# Patient Record
Sex: FEMALE | Race: WHITE | NOT HISPANIC OR LATINO | Employment: UNEMPLOYED | URBAN - METROPOLITAN AREA
[De-identification: names, ages, dates, MRNs, and addresses within clinical notes are randomized per-mention and may not be internally consistent; named-entity substitution may affect disease eponyms.]

---

## 2024-09-29 ENCOUNTER — OFFICE VISIT (OUTPATIENT)
Age: 24
End: 2024-09-29
Payer: COMMERCIAL

## 2024-09-29 VITALS
WEIGHT: 128.8 LBS | RESPIRATION RATE: 18 BRPM | DIASTOLIC BLOOD PRESSURE: 72 MMHG | SYSTOLIC BLOOD PRESSURE: 102 MMHG | HEART RATE: 85 BPM | OXYGEN SATURATION: 98 % | TEMPERATURE: 98 F

## 2024-09-29 DIAGNOSIS — H00.014 HORDEOLUM EXTERNUM OF LEFT UPPER EYELID: Primary | ICD-10-CM

## 2024-09-29 PROCEDURE — S9083 URGENT CARE CENTER GLOBAL: HCPCS | Performed by: NURSE PRACTITIONER

## 2024-09-29 PROCEDURE — G0382 LEV 3 HOSP TYPE B ED VISIT: HCPCS | Performed by: NURSE PRACTITIONER

## 2024-09-29 RX ORDER — NEOMYCIN SULFATE, POLYMYXIN B SULFATE AND BACITRACIN ZINC 3.5; 10000; 4 MG/G; [USP'U]/G; [USP'U]/G
0.5 OINTMENT OPHTHALMIC 2 TIMES DAILY
Qty: 3.5 G | Refills: 0 | Status: SHIPPED | OUTPATIENT
Start: 2024-09-29 | End: 2024-09-29 | Stop reason: RX

## 2024-09-29 RX ORDER — FLUCONAZOLE 150 MG/1
150 TABLET ORAL
COMMUNITY
Start: 2024-09-09

## 2024-09-29 RX ORDER — BACITRACIN 500 [USP'U]/G
OINTMENT OPHTHALMIC EVERY 4 HOURS
Qty: 3.5 G | Refills: 0 | Status: SHIPPED | OUTPATIENT
Start: 2024-09-29 | End: 2024-09-29 | Stop reason: RX

## 2024-09-29 RX ORDER — SULFAMETHOXAZOLE/TRIMETHOPRIM 800-160 MG
1 TABLET ORAL EVERY 12 HOURS SCHEDULED
Qty: 14 TABLET | Refills: 0 | Status: SHIPPED | OUTPATIENT
Start: 2024-09-29 | End: 2024-09-29 | Stop reason: RX

## 2024-09-29 RX ORDER — NORETHINDRONE ACETATE AND ETHINYL ESTRADIOL AND FERROUS FUMARATE 1MG-20(21)
KIT ORAL
COMMUNITY
Start: 2024-09-19

## 2024-09-29 RX ORDER — NEOMYCIN SULFATE, POLYMYXIN B SULFATE AND BACITRACIN ZINC 3.5; 10000; 4 MG/G; [USP'U]/G; [USP'U]/G
0.5 OINTMENT OPHTHALMIC 2 TIMES DAILY
Qty: 3.5 G | Refills: 0 | Status: SHIPPED | OUTPATIENT
Start: 2024-09-29 | End: 2024-09-29 | Stop reason: SDUPTHER

## 2024-09-29 RX ORDER — SULFAMETHOXAZOLE/TRIMETHOPRIM 800-160 MG
1 TABLET ORAL EVERY 12 HOURS SCHEDULED
Qty: 14 TABLET | Refills: 0 | Status: SHIPPED | OUTPATIENT
Start: 2024-09-29 | End: 2024-09-29 | Stop reason: SDUPTHER

## 2024-09-29 RX ORDER — ERYTHROMYCIN 5 MG/G
0.5 OINTMENT OPHTHALMIC EVERY 8 HOURS SCHEDULED
Qty: 21 G | Refills: 0 | Status: SHIPPED | OUTPATIENT
Start: 2024-09-29 | End: 2024-10-06

## 2024-09-29 RX ORDER — DOXEPIN HYDROCHLORIDE 50 MG/1
50 CAPSULE ORAL
COMMUNITY
Start: 2024-07-25

## 2024-09-29 NOTE — PATIENT INSTRUCTIONS
"Patient Education     Stye   The Basics   Written by the doctors and editors at [facility]   What is a stye? -- A stye is a red and painful lump on the eyelid. It happens when a small gland on the edge of the eyelid gets infected or inflamed. Styes can form on the upper or lower eyelids. Most styes get better on their own after a few days to a week. The medical term for stye is \"hordeolum.\"  People sometimes get a stye confused with a different eye problem called a \"chalazion.\" A chalazion also causes a lump on the eyelid. But a stye is caused by an infection and is painful. A chalazion is not tender or painful, but it often lasts longer than a stye does.  What are the symptoms of a stye? -- People who have a stye have a painful lump on the edge of their eyelid (picture 1). The lump might look red, swollen, or similar to a pimple.  A stye usually develops over a few days. Styes can cause other symptoms, too, such as tearing and eyelid pain and swelling.  Is there a test for a stye? -- No. But your doctor or nurse should be able to tell if you have a stye by talking with you and doing an exam.  Is there anything I can do on my own? -- Yes:   Put a warm, wet compress on the stye. Wet a clean washcloth with warm water, and put it over your stye. When the washcloth cools, reheat it with warm water and put it back over the stye. Repeat these steps for about 15 minutes, and try to do this 4 times a day.   It might help to gently massage your eyelid.   Do not squeeze or try to pop your stye. This can make it worse.   Do not wear eye makeup or contact lenses until your stye is gone.  What treatments might my doctor use? -- If your stye doesn't get better or if it leads to other problems, your doctor might:   Prescribe a cream or ointment that goes in the eye and on the eyelid   Prescribe antibiotic medicines   Do a procedure to drain the stye  Can styes be prevented? -- Yes. To lower your chances of getting a stye, you can:   " Wash your hands often - It's especially important to wash your hands before you touch your eyes. Also, if you wear contact lenses, keep them clean and wash your hands before you put them in.   Be careful with your eye makeup - Wearing eye makeup can sometimes cause a stye. Remove your eye makeup each night, and throw away old makeup. Do not share eye makeup with other people.  When should I call the doctor? -- Call your doctor or nurse for advice if:   Your stye doesn't go away after using warm compresses for 1 to 2 weeks.   Your stye gets very big, bleeds, or affects your vision.   Your whole eye is red, or your whole eyelid is red or swollen.   The redness or swelling spreads to your cheek or other parts of your face.  All topics are updated as new evidence becomes available and our peer review process is complete.  This topic retrieved from [employer] on: Feb 26, 2024.  Topic 49041 Version 17.0  Release: 32.2.4 - C32.56  © 2024 UpToDate, Inc. and/or its affiliates. All rights reserved.  picture 1: Stye     This person has a stye on the lower eyelid.  Graphic 70298 Version 2.0  Consumer Information Use and Disclaimer   Disclaimer: This generalized information is a limited summary of diagnosis, treatment, and/or medication information. It is not meant to be comprehensive and should be used as a tool to help the user understand and/or assess potential diagnostic and treatment options. It does NOT include all information about conditions, treatments, medications, side effects, or risks that may apply to a specific patient. It is not intended to be medical advice or a substitute for the medical advice, diagnosis, or treatment of a health care provider based on the health care provider's examination and assessment of a patient's specific and unique circumstances. Patients must speak with a health care provider for complete information about their health, medical questions, and treatment options, including any risks or  benefits regarding use of medications. This information does not endorse any treatments or medications as safe, effective, or approved for treating a specific patient. UpToDate, Inc. and its affiliates disclaim any warranty or liability relating to this information or the use thereof.The use of this information is governed by the Terms of Use, available at https://www.[employer].com/en/know/clinical-effectiveness-terms. 2024© UpToDate, Inc. and its affiliates and/or licensors. All rights reserved.  Copyright   © 2024 UpToDate, Inc. and/or its affiliates. All rights reserved.

## 2024-09-29 NOTE — PROGRESS NOTES
[facility]        NAME: Elizabeth Cazares is a 24 y.o. female  : 2000    MRN: [id number]  DATE: 2024  TIME: 10:55 AM    Assessment and Plan   Hordeolum externum of left upper eyelid [H00.014]  1. Hordeolum externum of left upper eyelid  neomycin-bacitracin-polymyxin (NEOSPORIN) ophthalmic ointment    sulfamethoxazole-trimethoprim (BACTRIM DS) 800-160 mg per tablet    DISCONTINUED: neomycin-bacitracin-polymyxin (NEOSPORIN) ophthalmic ointment    DISCONTINUED: sulfamethoxazole-trimethoprim (BACTRIM DS) 800-160 mg per tablet            Patient Instructions   Will give topical abx and oral abx at this time. Advised on use of medications. Advised pt to f/u with Ophthalmology tomorrow if no improvement or worsening of sx. Pt advised on red flags which warrant more emergent f/u such as change in vision, spreading of redness ect. Pt advised to continue warm compresses.     Follow up with PCP in 3-5 days.  Proceed to  ER if symptoms worsen.    If tests have been performed at [facility], our office will contact you with results if changes need to be made to the care plan discussed with you at the visit.  [facility]    Chief Complaint     Chief Complaint   Patient presents with    Blepharitis     Eyelid swelling that started Wednesday, thinks it started from a stye.          History of Present Illness       Pt reports eye lid swelling 4 days ago. She reports it felt like a stye, but the swelling increased. She reports the area is painful to touch. She has used warm and cold compresses, eye drops for styes, and an antihistime. She denies change in vision, discharge from the area.     Eye Pain   The left eye is affected. This is a new problem. The current episode started in the past 7 days. The problem has been gradually worsening. There was no injury mechanism. The pain is moderate. There is No known exposure to pink eye. She Does not wear contacts.  Pertinent negatives include no blurred vision, eye discharge, double vision, eye redness, fever, foreign body sensation, itching, nausea, photophobia, recent URI or vomiting. Treatments tried: cold and warm compresses, otc stye eye drops. The treatment provided no relief.       Review of Systems   Review of Systems   Constitutional: Negative.  Negative for fever.   HENT: Negative.     Eyes:  Positive for pain (upper left eyelid swelling and pain). Negative for blurred vision, double vision, photophobia, discharge, redness and itching.   Respiratory: Negative.     Cardiovascular: Negative.    Gastrointestinal:  Negative for nausea and vomiting.   Neurological: Negative.          Current Medications       Current Outpatient Medications:     doxepin (SINEquan) 50 mg capsule, Take 50 mg by mouth daily at bedtime, Disp: , Rfl:     fluconazole (DIFLUCAN) 150 mg tablet, 150 mg, Disp: , Rfl:     Junel FE 1/20 1-20 MG-MCG per tablet, , Disp: , Rfl:     neomycin-bacitracin-polymyxin (NEOSPORIN) ophthalmic ointment, Administer 0.5 inches into the left eye 2 (two) times a day, Disp: 3.5 g, Rfl: 0    sulfamethoxazole-trimethoprim (BACTRIM DS) 800-160 mg per tablet, Take 1 tablet by mouth every 12 (twelve) hours for 7 days, Disp: 14 tablet, Rfl: 0    Current Allergies     Allergies as of 09/29/2024    (No Known Allergies)            The following portions of the patient's history were reviewed and updated as appropriate: allergies, current medications, past family history, past medical history, past social history, past surgical history and problem list.     History reviewed. No pertinent past medical history.    History reviewed. No pertinent surgical history.    Family History   Problem Relation Age of Onset    No Known Problems Mother     No Known Problems Father          Medications have been verified.        Objective   /72   Pulse 85   Temp 98 °F (36.7 °C)   Resp 18   Wt 58.4 kg (128 lb 12.8 oz)   LMP 09/10/2024    SpO2 98%   Patient's last menstrual period was 09/10/2024.       Physical Exam     Physical Exam  Constitutional:       General: She is not in acute distress.     Appearance: Normal appearance. She is not ill-appearing.   HENT:      Head: Normocephalic and atraumatic.      Right Ear: External ear normal.      Left Ear: External ear normal.      Nose: Nose normal.   Eyes:      General: Lids are everted, no foreign bodies appreciated. Vision grossly intact. No allergic shiner, visual field deficit or scleral icterus.        Right eye: No foreign body, discharge or hordeolum.         Left eye: Hordeolum present.No foreign body or discharge.      Extraocular Movements:      Right eye: Normal extraocular motion.      Left eye: Normal extraocular motion.      Conjunctiva/sclera: Conjunctivae normal.      Right eye: Right conjunctiva is not injected. No chemosis, exudate or hemorrhage.     Left eye: Left conjunctiva is not injected. No chemosis, exudate or hemorrhage.     Comments: Moderate upper left eyelid swelling, redness, and TTP.    Pulmonary:      Effort: Pulmonary effort is normal. No respiratory distress.   Neurological:      Mental Status: She is alert and oriented to person, place, and time.   Psychiatric:         Mood and Affect: Mood normal.         Behavior: Behavior normal.         Thought Content: Thought content normal.         Judgment: Judgment normal.

## 2024-11-21 ENCOUNTER — OFFICE VISIT (OUTPATIENT)
Age: 24
End: 2024-11-21
Payer: COMMERCIAL

## 2024-11-21 VITALS
WEIGHT: 133 LBS | DIASTOLIC BLOOD PRESSURE: 68 MMHG | SYSTOLIC BLOOD PRESSURE: 116 MMHG | HEART RATE: 82 BPM | BODY MASS INDEX: 23.57 KG/M2 | HEIGHT: 63 IN | OXYGEN SATURATION: 98 %

## 2024-11-21 DIAGNOSIS — G89.29 CHRONIC NONINTRACTABLE HEADACHE, UNSPECIFIED HEADACHE TYPE: ICD-10-CM

## 2024-11-21 DIAGNOSIS — L20.82 FLEXURAL ECZEMA: ICD-10-CM

## 2024-11-21 DIAGNOSIS — F41.1 GAD (GENERALIZED ANXIETY DISORDER): ICD-10-CM

## 2024-11-21 DIAGNOSIS — R51.9 CHRONIC NONINTRACTABLE HEADACHE, UNSPECIFIED HEADACHE TYPE: ICD-10-CM

## 2024-11-21 DIAGNOSIS — Z00.00 ANNUAL PHYSICAL EXAM: Primary | ICD-10-CM

## 2024-11-21 DIAGNOSIS — Z23 ENCOUNTER FOR IMMUNIZATION: ICD-10-CM

## 2024-11-21 PROCEDURE — 90471 IMMUNIZATION ADMIN: CPT | Performed by: FAMILY MEDICINE

## 2024-11-21 PROCEDURE — 99213 OFFICE O/P EST LOW 20 MIN: CPT | Performed by: FAMILY MEDICINE

## 2024-11-21 PROCEDURE — 90656 IIV3 VACC NO PRSV 0.5 ML IM: CPT | Performed by: FAMILY MEDICINE

## 2024-11-21 PROCEDURE — 99385 PREV VISIT NEW AGE 18-39: CPT | Performed by: FAMILY MEDICINE

## 2024-11-21 RX ORDER — DOXEPIN HYDROCHLORIDE 50 MG/1
50 CAPSULE ORAL
Qty: 30 CAPSULE | Refills: 1 | Status: SHIPPED | OUTPATIENT
Start: 2024-11-21

## 2024-11-21 RX ORDER — TRIAMCINOLONE ACETONIDE 1 MG/G
CREAM TOPICAL
COMMUNITY
Start: 2024-10-14

## 2024-11-21 RX ORDER — PIMECROLIMUS 10 MG/G
CREAM TOPICAL
COMMUNITY
Start: 2024-11-19

## 2024-11-21 NOTE — PATIENT INSTRUCTIONS
"Patient Education     Routine physical for adults   The Basics   Written by the doctors and editors at [facility]   What is a physical? -- A physical is a routine visit, or \"check-up,\" with your doctor. You might also hear it called a \"wellness visit\" or \"preventive visit.\"  During each visit, the doctor will:   Ask about your physical and mental health   Ask about your habits, behaviors, and lifestyle   Do an exam   Give you vaccines if needed   Talk to you about any medicines you take   Give advice about your health   Answer your questions  Getting regular check-ups is an important part of taking care of your health. It can help your doctor find and treat any problems you have. But it's also important for preventing health problems.  A routine physical is different from a \"sick visit.\" A sick visit is when you see a doctor because of a health concern or problem. Since physicals are scheduled ahead of time, you can think about what you want to ask the doctor.  How often should I get a physical? -- It depends on your age and health. In general, for people age 21 years and older:   If you are younger than 50 years, you might be able to get a physical every 3 years.   If you are 50 years or older, your doctor might recommend a physical every year.  If you have an ongoing health condition, like diabetes or high blood pressure, your doctor will probably want to see you more often.  What happens during a physical? -- In general, each visit will include:   Physical exam - The doctor or nurse will check your height, weight, heart rate, and blood pressure. They will also look at your eyes and ears. They will ask about how you are feeling and whether you have any symptoms that bother you.   Medicines - It's a good idea to bring a list of all the medicines you take to each doctor visit. Your doctor will talk to you about your medicines and answer any questions. Tell them if you are having any side effects that bother you. You " "should also tell them if you are having trouble paying for any of your medicines.   Habits and behaviors - This includes:   Your diet   Your exercise habits   Whether you smoke, drink alcohol, or use drugs   Whether you are sexually active   Whether you feel safe at home  Your doctor will talk to you about things you can do to improve your health and lower your risk of health problems. They will also offer help and support. For example, if you want to quit smoking, they can give you advice and might prescribe medicines. If you want to improve your diet or get more physical activity, they can help you with this, too.   Lab tests, if needed - The tests you get will depend on your age and situation. For example, your doctor might want to check your:   Cholesterol   Blood sugar   Iron level   Vaccines - The recommended vaccines will depend on your age, health, and what vaccines you already had. Vaccines are very important because they can prevent certain serious or deadly infections.   Discussion of screening - \"Screening\" means checking for diseases or other health problems before they cause symptoms. Your doctor can recommend screening based on your age, risk, and preferences. This might include tests to check for:   Cancer, such as breast, prostate, cervical, ovarian, colorectal, prostate, lung, or skin cancer   Sexually transmitted infections, such as chlamydia and gonorrhea   Mental health conditions like depression and anxiety  Your doctor will talk to you about the different types of screening tests. They can help you decide which screenings to have. They can also explain what the results might mean.   Answering questions - The physical is a good time to ask the doctor or nurse questions about your health. If needed, they can refer you to other doctors or specialists, too.  Adults older than 65 years often need other care, too. As you get older, your doctor will talk to you about:   How to prevent falling at " home   Hearing or vision tests   Memory testing   How to take your medicines safely   Making sure that you have the help and support you need at home  All topics are updated as new evidence becomes available and our peer review process is complete.  This topic retrieved from [employer] on: May 02, 2024.  Topic 259702 Version 1.0  Release: 32.4.3 - C32.122  © 2024 UpToDate, Inc. and/or its affiliates. All rights reserved.  Consumer Information Use and Disclaimer   Disclaimer: This generalized information is a limited summary of diagnosis, treatment, and/or medication information. It is not meant to be comprehensive and should be used as a tool to help the user understand and/or assess potential diagnostic and treatment options. It does NOT include all information about conditions, treatments, medications, side effects, or risks that may apply to a specific patient. It is not intended to be medical advice or a substitute for the medical advice, diagnosis, or treatment of a health care provider based on the health care provider's examination and assessment of a patient's specific and unique circumstances. Patients must speak with a health care provider for complete information about their health, medical questions, and treatment options, including any risks or benefits regarding use of medications. This information does not endorse any treatments or medications as safe, effective, or approved for treating a specific patient. UpToDate, Inc. and its affiliates disclaim any warranty or liability relating to this information or the use thereof.The use of this information is governed by the Terms of Use, available at https://www.[employer].com/en/know/clinical-effectiveness-terms. 2024© UpToDate, Inc. and its affiliates and/or licensors. All rights reserved.  Copyright   © 2024 UpToDate, Inc. and/or its affiliates. All rights reserved.

## 2024-11-21 NOTE — PROGRESS NOTES
Adult Annual Physical  Name: Elizabeth Cazares      : 2000      MRN: [id number]  Encounter Provider: RENE Connors  Encounter Date: 2024   Encounter department: [facility]    Assessment & Plan  Annual physical exam         ARABELLA (generalized anxiety disorder)  Controlled         Flexural eczema  Patient saw Dermatologist at [facility] and is on trimacinolone         Encounter for immunization    Orders:    influenza vaccine preservative-free 0.5 mL IM (Fluzone, Afluria, Fluarix, Flulaval)    Chronic nonintractable headache, unspecified headache type    Orders:    doxepin (SINEquan) 50 mg capsule; Take 1 capsule (50 mg total) by mouth daily at bedtime      Immunizations and preventive care screenings were discussed with patient today. Appropriate education was printed on patient's after visit summary.    Counseling:  Exercise: the importance of regular exercise/physical activity was discussed. Recommend exercise 3-5 times per week for at least 30 minutes.          History of Present Illness     Adult Annual Physical:  Patient presents for annual physical.     Diet and Physical Activity:  - Diet/Nutrition: well balanced diet.  - Exercise: walking.    General Health:  - Sleep: sleeps well.  - Hearing: normal hearing bilateral ears.  - Vision: wears glasses.  - Dental: regular dental visits.    /GYN Health:    - Last menstrual cycle: 2024.   - Contraception: oral contraceptives. Last PAP was , wnl      Review of Systems   Constitutional:  Negative for chills and fever.   HENT:  Negative for ear pain and sore throat.    Eyes:  Negative for pain and visual disturbance.   Respiratory:  Negative for cough and shortness of breath.    Cardiovascular:  Negative for chest pain and palpitations.   Gastrointestinal:  Negative for abdominal pain and vomiting.   Genitourinary:  Negative for dysuria and hematuria.   Musculoskeletal:  Negative for arthralgias and  "back pain.   Skin:  Negative for color change and rash.   Neurological:  Negative for seizures and syncope.   All other systems reviewed and are negative.        Objective   /68 (BP Location: Left arm, Patient Position: Sitting, Cuff Size: Standard)   Pulse 82   Ht 5' 3\" (1.6 m)   Wt 60.3 kg (133 lb)   SpO2 98%   BMI 23.56 kg/m²     Physical Exam  Vitals and nursing note reviewed.   Constitutional:       General: She is not in acute distress.     Appearance: She is well-developed.   HENT:      Head: Normocephalic and atraumatic.   Eyes:      Conjunctiva/sclera: Conjunctivae normal.   Cardiovascular:      Rate and Rhythm: Normal rate and regular rhythm.      Heart sounds: No murmur heard.  Pulmonary:      Effort: Pulmonary effort is normal. No respiratory distress.      Breath sounds: Normal breath sounds.   Abdominal:      Palpations: Abdomen is soft.      Tenderness: There is no abdominal tenderness.   Musculoskeletal:         General: No swelling.      Cervical back: Neck supple.   Skin:     General: Skin is warm and dry.      Capillary Refill: Capillary refill takes less than 2 seconds.   Neurological:      Mental Status: She is alert.   Psychiatric:         Mood and Affect: Mood normal.         "

## 2025-01-06 DIAGNOSIS — G89.29 CHRONIC NONINTRACTABLE HEADACHE, UNSPECIFIED HEADACHE TYPE: ICD-10-CM

## 2025-01-06 DIAGNOSIS — R51.9 CHRONIC NONINTRACTABLE HEADACHE, UNSPECIFIED HEADACHE TYPE: ICD-10-CM

## 2025-01-07 RX ORDER — DOXEPIN HYDROCHLORIDE 50 MG/1
50 CAPSULE ORAL
Qty: 90 CAPSULE | Refills: 1 | Status: SHIPPED | OUTPATIENT
Start: 2025-01-07

## 2025-07-09 DIAGNOSIS — R51.9 CHRONIC NONINTRACTABLE HEADACHE, UNSPECIFIED HEADACHE TYPE: ICD-10-CM

## 2025-07-09 DIAGNOSIS — G89.29 CHRONIC NONINTRACTABLE HEADACHE, UNSPECIFIED HEADACHE TYPE: ICD-10-CM

## 2025-07-10 RX ORDER — DOXEPIN HYDROCHLORIDE 50 MG/1
50 CAPSULE ORAL
Qty: 90 CAPSULE | Refills: 1 | Status: SHIPPED | OUTPATIENT
Start: 2025-07-10

## 2025-08-11 ENCOUNTER — TELEPHONE (OUTPATIENT)
Age: 25
End: 2025-08-11